# Patient Record
Sex: FEMALE | Race: WHITE | NOT HISPANIC OR LATINO | Employment: FULL TIME | URBAN - METROPOLITAN AREA
[De-identification: names, ages, dates, MRNs, and addresses within clinical notes are randomized per-mention and may not be internally consistent; named-entity substitution may affect disease eponyms.]

---

## 2017-03-07 ENCOUNTER — ALLSCRIPTS OFFICE VISIT (OUTPATIENT)
Dept: OTHER | Facility: OTHER | Age: 37
End: 2017-03-07

## 2017-03-11 LAB
HPV 18 (HISTORICAL): NOT DETECTED
HPV HIGH RISK 16/18 (HISTORICAL): NOT DETECTED
HPV16 (HISTORICAL): NOT DETECTED
PAP (HISTORICAL): NORMAL

## 2017-09-11 ENCOUNTER — ALLSCRIPTS OFFICE VISIT (OUTPATIENT)
Dept: OTHER | Facility: OTHER | Age: 37
End: 2017-09-11

## 2018-01-09 NOTE — MISCELLANEOUS
Message   Recorded as Task   Date: 08/08/2016 12:07 PM, Created By: Brian Díaz   Task Name: Medical Complaint Callback   Assigned To: Cj Delaney   Regarding Patient: Tri Fletcher, Status: Active   Comment:    Brian Díaz - 08 Aug 2016 12:07 PM     TASK CREATED  Pt called reports that the effexor caused bad side effects, reports that it made her want to throw up and she could not eat while taking the medication, reports that it als made her very lightheaded  She only took this for 2 days and then stopped on 7/30  She states that you discussed another medication with her, she can not remember the name, states that it was to take at bedtime and it could make her tired  Cj Delaney - 08 Aug 2016 3:15 PM     TASK REPLIED TO: Previously Assigned To [employer]  Obviously she should be off of the effexor at this point  Do you know if she looked for/started an herbal as well? It is detailed in my note  If she wants to start with that before trying another preventative that is ok, but otherwise we can plan for Amitriptyline 10mg to be taken at bedtime X 2 weeks and then increased to 2 tabs/20mg X 2 weeks, and then 3 tabs  She should give us a call after she is at 3 tabs for a week to report on her progress  SE's may include tiredness or dry mouth  Possibly a little weight gain  Less likely some light headedness  She should remain well hydrated on this medicaiton  As it is also an antidepressant if she notices a sudden adverse affect on her mood she should stop the med and call us but that is extremely unlikely, especially at this low dose  Eva Whaley - 09 Aug 2016 9:50 AM     TASK EDITED  LMOM to return call  Chelly Arevalo - 09 Aug 2016 10:37 AM     TASK REPLIED TO: Previously Assigned To Brian Díaz  Patient is off of the Effexor   She has not started the herbal supplement yet as she just got back from vacation but she plans on getting and starting that today or in the new few days  She would like to try this first and will call back if it is ineffective for the amitriptyline          Plan  Cervicalgia, Chronic migraine without aura, Insomnia    · Amitriptyline HCl - 10 MG Oral Tablet; 1 tab at bedtime X 2 weeks and then  increase by 1 tab at bedtime every 2 weeks to initial target 30mg  Chronic migraine without aura    · Venlafaxine HCl ER 37 5 MG Oral Tablet Extended Release 24 Hour    Signatures   Electronically signed by : Yanely Gomez MD; Aug  9 2016  5:22PM EST                       (Author)

## 2018-01-11 NOTE — MISCELLANEOUS
Message   Recorded as Task   Date: 11/21/2016 12:37 PM, Created By: Jonna Dhaliwal   Task Name: Medical Complaint Callback   Assigned To: Natividad Weiner   Regarding Patient: Tiara Morris, Status: Active   CommentDelshilpa Bowie - 21 Nov 2016 12:37 PM     TASK CREATED  Caller: Self; Medical Complaint; [phone] (Home)  Pt had Lap w L87 10/27/16  Pt called w complaint of pain on the R side   the same type of pain she had prior to the procedure  Pt would like to speak w a nurse  Pt is @ [phone]   Silvasamy Morrissey - 21 Nov 2016 1:25 PM     TASK IN PROGRESS   Silvasamy Morrissey - 21 Nov 2016 1:34 PM     TASK EDITED  pt called back c/o of right sided pain that isnt going away, states she cant even lift her daughter without pain, stated she can jogg, run, or go hiking without pain, advised she should be resuming activity slowly, pt extreamly agitated that she was told it would be 1 week recovery time and its been a month since the proceudre and is till having pain, according to last not she had good pain control, however patient stated the pain medications do not work at all  please advise, you just saw this patient nov 1st thanks! Elton Mccullough - 21 Nov 2016 3:09 PM     TASK REPLIED TO: Previously Assigned To Elton Mccullough  she needs an office visit  she should be having no pain at this point   Silvasamy Morrissey - 21 Nov 2016 3:29 PM     TASK EDITED  apt scheduled for 1115 on wed, pt aware, ok'd per ruba in [facility] office  Active Problems    1  Cervicalgia (723 1) (M54 2)   2  Chronic migraine without aura (346 70) (G43 709)   3  Cyst of finger (709 8)   4  Dysmenorrhea (625 3) (N94 6)   5  Female infertility (628 9) (N97 9)   6  Female pelvic pain (625 9) (R10 2)   7  Insomnia (780 52) (G47 00)   8  Lesion of finger (709 9) (L98 9)   9  Mass of finger (782 2) (R22 30)   10  Personal history of ovarian cyst (V13 29) (Z87 42)   11  Postoperative examination (V67 00) (Z09)   12  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)   13  Sinusitis (473 9) (J32 9)   14  Strain of left trapezius muscle, initial encounter (840 8) (S46 812A)   15  TMJ tenderness (524 62) (M26 629)   16  Urinary tract infection (599 0) (N39 0)    Current Meds   1  Ambien 10 MG Oral Tablet (Zolpidem Tartrate); TAKE 1 TABLET AT BEDTIME AS   NEEDED Recorded   2  Mefenamic Acid 250 MG Oral Capsule; TAKE 2 CAPSULES NOW, THEN 1 CAPSULE   EVERY 6 HOURS WITH FOOD; Therapy: [id number] to (Evaluate:88Bxx6867)  Requested for: 06XWI5650; Last   Rx:28Oct2016 Ordered   3  Spironolactone 100 MG Oral Tablet; Take 1 tablet twice daily Recorded   4  Topiramate 100 MG Oral Tablet (Topamax); TAKE 1 TABLET TWICE DAILY  Requested   for: 19Oct2016; Last Rx:18Oct2016 Ordered   5  Tretinoin 0 025 % External Cream; Apply as directed Recorded   6  Valium 5 MG Oral Tablet (DiazePAM); Therapy: (Recorded:59Lsi6574) to Recorded    Allergies    1  Triptans    2  No Known Environmental Allergies   3   No Known Food Allergies    Signatures   Electronically signed by : Milli Jain LPN; Nov 21 6737  6:96HQ EST                       (Author)

## 2018-01-11 NOTE — MISCELLANEOUS
Message   Recorded as Task   Date: 10/28/2016 09:00 AM, Created By: Bruna Tanner   Task Name: Call Back   Assigned To: Dinesh Joan   Regarding Patient: Cierra Moore, Status: In Progress   Comment:    Sylvia Jamison - 28 Oct 2016 9:00 AM     TASK CREATED  Caller: Self; [phone] (Home); [phone] (Work)  pt called - she is having problems after her surgery and the pills KTM gave her arent helping  please advise [phone]   Melanie Knapp - 28 Oct 2016 9:09 AM     TASK IN PROGRESS   Melanie Knapp - 28 Oct 2016 9:12 AM     TASK EDITED   spoke with pt   c/o severe pain which she rates as an 8    llast dose of vicodan was 6:30 plus benadryl which she states she needs due to itching with analgesics      awaiting call from Dulce Maya - 28 Oct 2016 9:41 AM     TASK EDITED  spoke with ktm    rx to ehr for ponstel 250mg    2 tablets times 2 doses to be followed by 1 tab q6h    vicodin is to be 1 tab q3h    spoke with     he will also begin stool softner   tcb with any questions        Active Problems    1  Cervicalgia (723 1) (M54 2)   2  Chronic migraine without aura (346 70) (G43 709)   3  Cyst of finger (709 8)   4  Dysmenorrhea (625 3) (N94 6)   5  Female infertility (628 9) (N97 9)   6  Female pelvic pain (625 9) (R10 2)   7  Insomnia (780 52) (G47 00)   8  Lesion of finger (709 9) (L98 9)   9  Mass of finger (782 2) (R22 30)   10  Personal history of ovarian cyst (V13 29) (Z87 42)   11  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)   12  Sinusitis (473 9) (J32 9)   13  Strain of left trapezius muscle, initial encounter (840 8) (S46 812A)   14  TMJ tenderness (524 62) (M26 629)   15  Urinary tract infection (599 0) (N39 0)    Current Meds   1  Ambien 10 MG Oral Tablet (Zolpidem Tartrate); TAKE 1 TABLET AT BEDTIME AS   NEEDED Recorded   2  Mefenamic Acid 250 MG Oral Capsule; TAKE 2 CAPSULES NOW, THEN 1 CAPSULE   EVERY 6 HOURS WITH FOOD;    Therapy: [id number] to (Evaluate:20Lhy5610) Requested for: 32IEX8926; Last   Rx:28Oct2016 Ordered   3  Spironolactone 100 MG Oral Tablet; Take 1 tablet twice daily Recorded   4  Topiramate 100 MG Oral Tablet (Topamax); TAKE 1 TABLET TWICE DAILY  Requested   for: 19Oct2016; Last Rx:18Oct2016 Ordered   5  Tretinoin 0 025 % External Cream; Apply as directed Recorded   6  Valium 5 MG Oral Tablet (DiazePAM); Therapy: (Recorded:50Dja7338) to Recorded    Allergies    1  Triptans    2  No Known Environmental Allergies   3   No Known Food Allergies    Signatures   Electronically signed by : Andrea Collins, ; Oct 28 2016  9:43AM EST                       (Author)

## 2018-01-13 VITALS
DIASTOLIC BLOOD PRESSURE: 58 MMHG | HEIGHT: 61 IN | BODY MASS INDEX: 18.88 KG/M2 | SYSTOLIC BLOOD PRESSURE: 92 MMHG | WEIGHT: 100 LBS

## 2018-01-13 VITALS
BODY MASS INDEX: 19.84 KG/M2 | WEIGHT: 105 LBS | SYSTOLIC BLOOD PRESSURE: 100 MMHG | DIASTOLIC BLOOD PRESSURE: 70 MMHG | TEMPERATURE: 98.7 F | HEART RATE: 78 BPM | RESPIRATION RATE: 16 BRPM

## 2018-01-15 NOTE — RESULT NOTES
Message   Recorded as Task   Date: 08/18/2016 09:03 AM, Created By: Kianna Chapin   Task Name: Follow Up   Assigned To: Claude Oropeza   Regarding Patient: Elissa Campoverde, Status: In Progress   Johnana Reynoso - 18 Aug 2016 9:03 AM     TASK CREATED  Caller: Self; [phone] (Home); [phone] (Work)  on all progesterone pill ,having  full on periods not just spotting and having migraines,ktm pt, not sure what to do [address] - 18 Aug 2016 9:44 AM     TASK IN PROGRESS   Dali Chapa - 18 Aug 2016 9:58 AM     TASK EDITED  Pt on pop only 6 days  Had ha entire time but 2 days ago got menses and with it a severe migraine  None of her migraine meds are working for it  She said this is same as when on regular ocs and she got menses  She is "updating you"  Stay on pop??  Pt very afraid of pregnancy also  Margaret Jaquez - 89 Aug 2016 10:45 AM     TASK REPLIED TO: Previously Assigned To Teresa Stuart        My next option for her was to completely discontinue her pills altogether  she has had a tubal- but if she is afraid of pergnancy she can use condoms in the meantime-  if discontinuing the pill makes her migraines better- one option would be to insert a paraguard IUD (no hormones) to REALLY make sure she doesn't get pregnant without altering her hormone levels  OR- sometimes it takes 2-3 months for someone to get used to a new pill- bleeding profile, migraines usually get better during that time frame if she can tolerate it - giving the pill more time may still be an option for her   Dali Chapa - 18 Aug 2016 1:32 PM     TASK EDITED  Pt DEFINITELY does not want an iud  NOTHING will go thru cervix again  - per pt  She will stay on pop pill but is very disgusted with all  Also afraid of ovarian cysts  Wants ovaries out   Will discuss with KTM at f/u appt  Signatures   Electronically signed by :  Rosalio Casas, ; Aug 18 2016  1:32PM EST (Author)

## 2018-01-15 NOTE — MISCELLANEOUS
Message   Recorded as Task   Date: 10/31/2016 09:17 AM, Created By: Martin Zayas   Task Name: Medical Complaint Callback   Assigned To: Jean Carpio   Regarding Patient: Radha Rehman, Status: In Progress   Comment:    Agueda Carney - 31 Oct 2016 9:17 AM     TASK CREATED  Caller: Self; [phone] (Home)  pt had surgery on 10/27 w/ ktm   pt explains on right incision it is very swollen and tender to the touch please advise pt @ [phone]   Dilcia Mathis - 31 Oct 2016 10:25 AM     TASK IN PROGRESS   Dilcia Mathis - 31 Oct 2016 10:30 AM     TASK EDITED                 pt states today she finally looked at her incision site and she noticed alot of swlling on the right side no d/c or odor  states she started feeling anuseated and dizzy as well today, please advise, states she taking the meds as directed  Julia Martins - 31 Oct 2016 12:56 PM     TASK REPLIED TO: Previously Assigned To LeonidMargaret                     swelling can be normal- she should schedule a post op visit to be seen  i was waiting for a pathology results to call her-  if she wants to drive to [address] [place] [address] she can- or [facility] can see her to look at her incisions in  any office for convenience  please stress that she should be hydrating and trying to eat a healhty well balanced diet too   Dilcia Mathis - 31 Oct 2016 1:16 PM     TASK EDITED                 mad ept aware, pt wants an apt with KTM in [facility]  Active Problems    1  Cervicalgia (723 1) (M54 2)   2  Chronic migraine without aura (346 70) (G43 709)   3  Cyst of finger (709 8)   4  Dysmenorrhea (625 3) (N94 6)   5  Female infertility (628 9) (N97 9)   6  Female pelvic pain (625 9) (R10 2)   7  Insomnia (780 52) (G47 00)   8  Lesion of finger (709 9) (L98 9)   9  Mass of finger (782 2) (R22 30)   10  Personal history of ovarian cyst (V13 29) (Z87 42)   11  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)   12  Sinusitis (473 9) (J32 9)   13   Strain of left trapezius muscle, initial encounter (840 8) (S46 812A)   14  TMJ tenderness (524 62) (M26 629)   15  Urinary tract infection (599 0) (N39 0)    Current Meds   1  Ambien 10 MG Oral Tablet (Zolpidem Tartrate); TAKE 1 TABLET AT BEDTIME AS   NEEDED Recorded   2  Mefenamic Acid 250 MG Oral Capsule; TAKE 2 CAPSULES NOW, THEN 1 CAPSULE   EVERY 6 HOURS WITH FOOD; Therapy: 09AWI7338 to (Evaluate:98Mvw7053)  Requested for: 85NKS6832; Last   Rx:28Oct2016 Ordered   3  Spironolactone 100 MG Oral Tablet; Take 1 tablet twice daily Recorded   4  Topiramate 100 MG Oral Tablet (Topamax); TAKE 1 TABLET TWICE DAILY  Requested   for: 19Oct2016; Last Rx:18Oct2016 Ordered   5  Tretinoin 0 025 % External Cream; Apply as directed Recorded   6  Valium 5 MG Oral Tablet (DiazePAM); Therapy: (Recorded:63Swp2785) to Recorded    Allergies    1  Triptans    2  No Known Environmental Allergies   3   No Known Food Allergies    Signatures   Electronically signed by : Maria M Padilla LPN; Oct 31 9339  2:60ER EST                       (Author)

## 2018-01-17 NOTE — MISCELLANEOUS
Message   Recorded as Task   Date: 10/28/2016 09:00 AM, Created By: Lani Sommers   Task Name: Call Back   Assigned To: Rosaline Valero   Regarding Patient: Sherie Macedo, Status: In Progress   Comment:    Sylvia Jamison - 28 Oct 2016 9:00 AM     TASK CREATED  Caller: Self; [phone] (Home); [phone] (Work)  pt called - she is having problems after her surgery and the pills KTM gave her arent helping  please advise [phone]   Melanie Knapp - 28 Oct 2016 9:09 AM     TASK IN PROGRESS   Melanie Knapp - 28 Oct 2016 9:12 AM     TASK EDITED   spoke with pt   c/o severe pain which she rates as an 8    llast dose of vicodan was 6:30 plus benadryl which she states she needs due to itching with analgesics      awaiting call from Ana Farah - 28 Oct 2016 9:41 AM     TASK EDITED  spoke with ktm    rx to ehr for ponstel 250mg    2 tablets times 2 doses to be followed by 1 tab q6h    vicodin is to be 1 tab q3h    spoke with     he will also begin stool softner   tcb with any questions        Active Problems    1  Cervicalgia (723 1) (M54 2)   2  Chronic migraine without aura (346 70) (G43 709)   3  Cyst of finger (709 8)   4  Dysmenorrhea (625 3) (N94 6)   5  Female infertility (628 9) (N97 9)   6  Female pelvic pain (625 9) (R10 2)   7  Insomnia (780 52) (G47 00)   8  Lesion of finger (709 9) (L98 9)   9  Mass of finger (782 2) (R22 30)   10  Personal history of ovarian cyst (V13 29) (Z87 42)   11  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)   12  Sinusitis (473 9) (J32 9)   13  Strain of left trapezius muscle, initial encounter (840 8) (S46 812A)   14  TMJ tenderness (524 62) (M26 629)   15  Urinary tract infection (599 0) (N39 0)    Current Meds   1  Ambien 10 MG Oral Tablet (Zolpidem Tartrate); TAKE 1 TABLET AT BEDTIME AS   NEEDED Recorded   2  Mefenamic Acid 250 MG Oral Capsule; TAKE 2 CAPSULES NOW, THEN 1 CAPSULE   EVERY 6 HOURS WITH FOOD;    Therapy: [id number] to (Evaluate:43Bsd6070) Requested for: 23INS6609; Last   Rx:28Oct2016 Ordered   3  Spironolactone 100 MG Oral Tablet; Take 1 tablet twice daily Recorded   4  Topiramate 100 MG Oral Tablet (Topamax); TAKE 1 TABLET TWICE DAILY  Requested   for: 19Oct2016; Last Rx:18Oct2016 Ordered   5  Tretinoin 0 025 % External Cream; Apply as directed Recorded   6  Valium 5 MG Oral Tablet (DiazePAM); Therapy: (Recorded:94Xso0243) to Recorded    Allergies    1  Triptans    2  No Known Environmental Allergies   3   No Known Food Allergies    Signatures   Electronically signed by : Shirin Leblanc, ; Oct 28 2016  9:45AM EST                       (Author)

## 2018-01-26 DIAGNOSIS — G43.109 MIGRAINE WITH AURA AND WITHOUT STATUS MIGRAINOSUS, NOT INTRACTABLE: Primary | ICD-10-CM

## 2018-01-26 NOTE — TELEPHONE ENCOUNTER
Pt last seen 10/2016 & has appt 4/18/18  Pt aware we will only refill until appt  If pt does not come, she will need to contact PCP for med refills

## 2018-03-16 ENCOUNTER — TELEPHONE (OUTPATIENT)
Dept: OBGYN CLINIC | Facility: CLINIC | Age: 38
End: 2018-03-16

## 2018-03-16 DIAGNOSIS — L65.9 HAIR LOSS: Primary | ICD-10-CM

## 2018-03-19 NOTE — TELEPHONE ENCOUNTER
It could be hormonal but there are many reasons for hair falling out  Has she had blood work done recently? If not would recommend checking TSH, CBC, Ferritin, BMP, estradiol    Other causes besides hormones or nutrition would be stress but the blood work will be helpful

## 2018-03-19 NOTE — TELEPHONE ENCOUNTER
Spoke with pt - patient had a Laparoscopic Salpingo Oopherectomy 10/27/2016  Patient is c/o her hair falling out and not growing back  She also states that her has no hair growth on her legs  Patient has not seen a dermatologist, thinks this is due to not having hormones  Patient wants to know what she can do  Please advise  Thanks!

## 2018-03-20 NOTE — TELEPHONE ENCOUNTER
Patient aware  Lab slips mailed to patient as requested  Goes to [facility]  Will call with results

## 2018-04-18 ENCOUNTER — OFFICE VISIT (OUTPATIENT)
Dept: NEUROLOGY | Facility: CLINIC | Age: 38
End: 2018-04-18
Payer: COMMERCIAL

## 2018-04-18 VITALS
SYSTOLIC BLOOD PRESSURE: 108 MMHG | DIASTOLIC BLOOD PRESSURE: 60 MMHG | WEIGHT: 110.1 LBS | HEART RATE: 73 BPM | BODY MASS INDEX: 20.26 KG/M2 | HEIGHT: 62 IN

## 2018-04-18 DIAGNOSIS — M26.609 TMJ DISEASE: Primary | ICD-10-CM

## 2018-04-18 DIAGNOSIS — G47.09 OTHER INSOMNIA: ICD-10-CM

## 2018-04-18 DIAGNOSIS — G43.701 CHRONIC MIGRAINE WITHOUT AURA WITH STATUS MIGRAINOSUS, NOT INTRACTABLE: Primary | ICD-10-CM

## 2018-04-18 DIAGNOSIS — G43.109 MIGRAINE WITH AURA AND WITHOUT STATUS MIGRAINOSUS, NOT INTRACTABLE: ICD-10-CM

## 2018-04-18 PROCEDURE — 99213 OFFICE O/P EST LOW 20 MIN: CPT | Performed by: PSYCHIATRY & NEUROLOGY

## 2018-04-18 RX ORDER — DIHYDROERGOTAMINE MESYLATE 4 MG/ML
1 SPRAY NASAL AS NEEDED
Qty: 8 ML | Refills: 6 | Status: SHIPPED | OUTPATIENT
Start: 2018-04-18

## 2018-04-18 NOTE — PROGRESS NOTES
Patient ID: Kaylin Vargas is a 44 y o  female  Assessment/Plan:    No problem-specific Assessment & Plan notes found for this encounter  Diagnoses and all orders for this visit:    Chronic migraine without aura with status migrainosus, not intractable  -     dihydroergotamine (MIGRANAL) 4 MG/ML nasal spray; 1 spray into each nostril as needed for migraine This can be repeated once in 15-30 minutes,  Maximum of 4 sprays per attack, maximum of 6 sprays per day, maximum of 8 sprays per week  Other insomnia    Migraine with aura and without status migrainosus, not intractable  -     topiramate (TOPAMAX) 200 MG tablet; Take 1 tablet (200 mg total) by mouth daily    Other orders  -     Calcium Carb-Cholecalciferol (CALCIUM 1000 + D PO); Take 1 tablet by mouth daily       Patient Instructions     Chronic migraine:  Nimco Grimm presents for follow-up with regard to her prior chronic migraines  She reports that since her bilateral oophorectomy she has been doing somewhat better, specifically she is experiencing 1 breakthrough migraine approximately every 3 months which is being effectively treated with migraine all nasal spray  She reports that she has experienced significant fatigue recently, some of which may be related to sleep problems, and that as result she has started to drink some caffeine as well  She does continue to experience significant TMJ and that this sometimes leads to her migraine headaches  - for migraine prevention we she  Would like to continue Topamax at her current dose of 200 mg daily with which I agree  Reportedly she is experiencing some alopecia but considering that she has been on Topamax for an extended period of time I think it is unlikely to be related  -I will research the possibility of Botox for TMJ on her behalf to see who would provide that service in our region   -I would advise her against using Migranal nasal spray in association with significant amounts of caffeine  Otherwise it can be used according to label instructions  Will plan for her to return to the office in 6 months time but I would be happy to see her sooner if the need should arise  I will be in touch with her with regard to Botox as soon as additional information is available  Subjective:    HPI  Thomas Patterson presents for follow-up with regard to her prior migraines  In the interval since her last visit to the office she has had a bilateral oophorectomy according to plan and this has significantly improved her migraines overall  At this point she is experiencing approximately 1 migraine headache every 3 months which is still quite severe but is completely treated with Migranal nasal spray  She notes that she does have ongoing difficulties with sleep and relatively new severe fatigue which she feels is likely hormonally nature  She did express concern with regard to the need for muscle relaxers including diazepam   This is being used to treat her TMJ which contributes to her headaches/migraines overall  She would be quite interested in transitioning to Botox as this would provide more consistent and appropriate treatment  I agree that this is a reasonable approach  Objective:    Blood pressure 108/60, pulse 73, height 5' 2" (1 575 m), weight 49 9 kg (110 lb 1 6 oz), not currently breastfeeding  Physical Exam    Neurological Exam    At the time of my evaluation Thomas Patterson was awake, alert, and in no acute distress  There were no obvious cranial neuropathies or lateralizing signs  ROS:    Review of Systems   Constitutional: Positive for fatigue  Negative for appetite change and fever  HENT: Positive for sinus pain and sinus pressure  Negative for hearing loss, tinnitus, trouble swallowing and voice change  Eyes: Negative  Negative for photophobia and pain  Respiratory: Negative  Negative for shortness of breath  Cardiovascular: Negative  Negative for palpitations  Gastrointestinal: Negative  Negative for nausea and vomiting  Endocrine: Negative  Negative for cold intolerance and heat intolerance  Genitourinary: Negative  Negative for dysuria, frequency and urgency  Musculoskeletal: Negative  Negative for myalgias and neck pain  Skin: Negative  Negative for rash  Neurological: Positive for headaches  Negative for dizziness, tremors, seizures, syncope, facial asymmetry, speech difficulty, weakness, light-headedness and numbness  Hematological: Negative  Does not bruise/bleed easily  Psychiatric/Behavioral: Positive for sleep disturbance  Negative for confusion and hallucinations  Total encounter time 20 minutes

## 2018-04-18 NOTE — PATIENT INSTRUCTIONS
Chronic migraine:  Lindsey Beach presents for follow-up with regard to her prior chronic migraines  She reports that since her bilateral oophorectomy she has been doing somewhat better, specifically she is experiencing 1 breakthrough migraine approximately every 3 months which is being effectively treated with migraine all nasal spray  She reports that she has experienced significant fatigue recently, some of which may be related to sleep problems, and that as result she has started to drink some caffeine as well  She does continue to experience significant TMJ and that this sometimes leads to her migraine headaches  - for migraine prevention we she  Would like to continue Topamax at her current dose of 200 mg daily with which I agree  Reportedly she is experiencing some alopecia but considering that she has been on Topamax for an extended period of time I think it is unlikely to be related  -I will research the possibility of Botox for TMJ on her behalf to see who would provide that service in our region   -I would advise her against using Migranal nasal spray in association with significant amounts of caffeine  Otherwise it can be used according to label instructions  Will plan for her to return to the office in 6 months time but I would be happy to see her sooner if the need should arise  I will be in touch with her with regard to Botox as soon as additional information is available

## 2018-04-19 ENCOUNTER — TELEPHONE (OUTPATIENT)
Dept: NEUROLOGY | Facility: CLINIC | Age: 38
End: 2018-04-19

## 2018-04-19 NOTE — TELEPHONE ENCOUNTER
LMOM for patient to call back  ----- Message from Sherry Sue MD sent at 4/18/2018  5:26 PM EDT -----  Regarding: patient referral  Please call Bartolo Anderson was kind enough to do some research and found that the [facility] group across the street does Botox for TMJ which she was very interested in pursuing  I placed a referral in the computer    Thanks!    -Dr Zahira Rubio

## 2018-04-29 DIAGNOSIS — G43.109 MIGRAINE WITH AURA AND WITHOUT STATUS MIGRAINOSUS, NOT INTRACTABLE: ICD-10-CM

## 2018-09-20 ENCOUNTER — TELEPHONE (OUTPATIENT)
Dept: NEUROLOGY | Facility: CLINIC | Age: 38
End: 2018-09-20

## 2018-09-20 NOTE — TELEPHONE ENCOUNTER
Called pt LMOM to r/s appointment on 10/26 because Dr Jaylin Ellis is not in the offcie at this time  If pt calls back please r/s with Dr Jaylin Ellis to another time

## 2018-09-25 ENCOUNTER — DOCUMENTATION (OUTPATIENT)
Dept: NEUROLOGY | Facility: CLINIC | Age: 38
End: 2018-09-25

## 2019-04-19 DIAGNOSIS — G43.719 INTRACTABLE CHRONIC MIGRAINE WITHOUT AURA AND WITHOUT STATUS MIGRAINOSUS: Primary | ICD-10-CM

## 2019-09-30 ENCOUNTER — TELEPHONE (OUTPATIENT)
Dept: NEUROLOGY | Facility: CLINIC | Age: 39
End: 2019-09-30

## 2019-09-30 DIAGNOSIS — G43.701 CHRONIC MIGRAINE WITHOUT AURA WITH STATUS MIGRAINOSUS, NOT INTRACTABLE: Primary | ICD-10-CM

## 2019-10-01 ENCOUNTER — TELEPHONE (OUTPATIENT)
Dept: NEUROLOGY | Facility: CLINIC | Age: 39
End: 2019-10-01

## 2019-10-01 NOTE — TELEPHONE ENCOUNTER
Left a message for the patient to return my call about an appointment with Dr Timmy [place] office   I left my name and number to return my call

## 2019-10-01 NOTE — TELEPHONE ENCOUNTER
Patient returned my call  I informed her that the records had been faxed over to Dr Irina Fox office and I setup the appointment with Dr Irina vizcaino for her which is on 2/7/19 @ 2:45pm at the [facility] location I gave her the address, I explained to the patient that per [facility] the referring physician's office must setup the first appointment for the patient  That is why I had to setup the appointment  This was the earliest appointment they had with Dr Chapis Garzon or any of the other Neurologist's  The patient stated she understood and this was not a problem  Spoke with Mercedes Shaw MA and informed her that the referral that Dr Marija Atkins put in for the patient is missing information and needs to be resubmitted  Bertha Aguillon stated she will inform Dr Marija Atkins of this  It needs Dr Irina Fox name, address and NPI number which I gave to Bertha Aguillon  When this is completed Bertha lassiter let me know so I may fax this to Dr Irina Fox office

## 2019-10-09 NOTE — TELEPHONE ENCOUNTER
Pt called in stated that she is faxing us a [employer]UGUSTO form to go to [facility]  Fax number provided

## 2019-10-10 NOTE — TELEPHONE ENCOUNTER
Received AUGUSTO form and faxed to [facility]

## 2023-05-09 NOTE — TELEPHONE ENCOUNTER
It is reasonable  Referral entered 
Office notes faxed to Dr Marko Cherry's office  I called and left a message on Dr Carmencita Arora voicemail to return my call  I left my name and number 
Patient states she moved to [place] last fall and did not believe she would need a new neurologist  She states she is now having trouble sleeping at night because she is so tense, she states her migraines are coming back because of this  She would like to schedule an appointment with Dr Leatha Lundberg  She states this office is asking for Dr Yesenia Venegas staff to call  [phone]  Okay to leave detailed message      Called Dr Eric Wallace office at [phone]  Patient will need a referral to neurology and her records faxed to [phone]  I did confirm that Dr Dickson Barrios treats the patient's conditions  Please enter referral if appropriate  Thank you!
Please fax referral and last 2 office notes to [phone]  Patient will need to be notified when this is complete so that she can schedule an appointment  Thanks!
Please notify patient Marline Baumgarten at [phone] so that she may schedule an appt  Sorry for any confusion, thanks! Okay to leave detailed message on her voicemail 
Opt out